# Patient Record
Sex: MALE | Race: BLACK OR AFRICAN AMERICAN | NOT HISPANIC OR LATINO | Employment: FULL TIME | URBAN - NONMETROPOLITAN AREA
[De-identification: names, ages, dates, MRNs, and addresses within clinical notes are randomized per-mention and may not be internally consistent; named-entity substitution may affect disease eponyms.]

---

## 2019-05-07 ENCOUNTER — APPOINTMENT (OUTPATIENT)
Dept: CT IMAGING | Facility: HOSPITAL | Age: 24
End: 2019-05-07

## 2019-05-07 ENCOUNTER — HOSPITAL ENCOUNTER (EMERGENCY)
Facility: HOSPITAL | Age: 24
Discharge: HOME | End: 2019-05-08
Admitting: EMERGENCY MEDICINE

## 2019-05-07 DIAGNOSIS — S01.81XA LACERATION OF FOREHEAD, INITIAL ENCOUNTER: Primary | ICD-10-CM

## 2019-05-07 PROCEDURE — 99283 EMERGENCY DEPT VISIT LOW MDM: CPT

## 2019-05-07 PROCEDURE — 70450 CT HEAD/BRAIN W/O DYE: CPT

## 2019-05-07 PROCEDURE — 70486 CT MAXILLOFACIAL W/O DYE: CPT

## 2019-05-07 RX ORDER — LIDOCAINE HYDROCHLORIDE AND EPINEPHRINE 10; 10 MG/ML; UG/ML
10 INJECTION, SOLUTION INFILTRATION; PERINEURAL ONCE
Status: COMPLETED | OUTPATIENT
Start: 2019-05-07 | End: 2019-05-08

## 2019-05-07 RX ORDER — LIDOCAINE HYDROCHLORIDE 10 MG/ML
10 INJECTION, SOLUTION EPIDURAL; INFILTRATION; INTRACAUDAL; PERINEURAL ONCE
Status: DISCONTINUED | OUTPATIENT
Start: 2019-05-07 | End: 2019-05-08 | Stop reason: HOSPADM

## 2019-05-08 VITALS
DIASTOLIC BLOOD PRESSURE: 73 MMHG | SYSTOLIC BLOOD PRESSURE: 132 MMHG | BODY MASS INDEX: 20.3 KG/M2 | WEIGHT: 158.2 LBS | OXYGEN SATURATION: 98 % | TEMPERATURE: 98.2 F | RESPIRATION RATE: 20 BRPM | HEIGHT: 74 IN | HEART RATE: 60 BPM

## 2019-05-08 RX ADMIN — LIDOCAINE HYDROCHLORIDE,EPINEPHRINE BITARTRATE 10 ML: 10; .01 INJECTION, SOLUTION INFILTRATION; PERINEURAL at 01:29

## 2019-05-08 NOTE — DISCHARGE INSTRUCTIONS
Suture removal in 5 days at PCP, urgent care, ED    Keep dry 24 hours, wash 3x a day with warm soapy water, gentle rinse, no scrubbing    Return to ED for worsening symptoms including pain, redness, swelling, drainage      Follow up with one of the [facility] physician groups below to setup primary care. If you have trouble following up, please call the [facility] Nurse Line at [phone]    Dr. Shahid Son DO, Dr. Francisco J Fontana DO,  MAGGI Youssef, and MAGGI Esteves  [facility] - [address], [address], [place], [place] [id number] [phone]     Dr. Iris Villegas MD, and MAGGI Cook  [facility] [address] [address], [place], [place] [id number] [phone]    Dr. Eldon Reaves MD and Dr. Khai Fulton MD  [facility] [address], [id number] [phone]    Dr. Kenny Spain MD  [facility]  [address] [place] [address], [address], [place], [place], [address] [phone]    Dr. Cm Kurtz MD  [facility]  [address], [place], [address] [phone]

## 2019-05-08 NOTE — ED PROVIDER NOTES
Subjective   Please look at other note this is purely for procedure note.            Review of Systems    History reviewed. No pertinent past medical history.    No Known Allergies    History reviewed. No pertinent surgical history.    History reviewed. No pertinent family history.    Social History     Socioeconomic History   • Marital status: Single     Spouse name: Not on file   • Number of children: Not on file   • Years of education: Not on file   • Highest education level: Not on file   Tobacco Use   • Smoking status: Former Smoker   • Smokeless tobacco: Current User     Types: Snuff   Substance and Sexual Activity   • Alcohol use: Yes     Frequency: Never     Comment: 2 drinks per week when off work   • Drug use: Defer   • Sexual activity: Defer           Objective   Physical Exam    Laceration Repair  Date/Time: 5/8/2019 1:07 AM  Performed by: Jodi Bee MD  Authorized by: Jodi Bee MD     Consent:     Consent obtained:  Verbal    Consent given by:  Patient    Risks discussed:  Need for additional repair, pain, poor cosmetic result, infection, nerve damage and poor wound healing    Alternatives discussed:  No treatment and delayed treatment  Anesthesia (see MAR for exact dosages):     Anesthesia method:  Local infiltration    Local anesthetic:  Lidocaine 1% WITH epi  Laceration details:     Location:  Face    Face location:  Forehead    Length (cm):  1  Repair type:     Repair type:  Simple  Pre-procedure details:     Preparation:  Patient was prepped and draped in usual sterile fashion and imaging obtained to evaluate for foreign bodies  Exploration:     Hemostasis achieved with:  Direct pressure    Wound exploration: wound explored through full range of motion    Skin repair:     Repair method:  Sutures    Suture size:  5-0    Suture material:  Prolene    Suture technique:  Simple interrupted    Number of sutures:  4  Approximation:     Approximation:  Close    Vermilion border: well-aligned     Post-procedure details:     Dressing:  Antibiotic ointment    Patient tolerance of procedure:  Tolerated well, no immediate complications               ED Course                  MDM      Final diagnoses:   Laceration of forehead, initial encounter            Jodi Bee MD  05/08/19 0109

## 2019-05-08 NOTE — ED PROVIDER NOTES
Subjective   History of Present Illness  23-year-old male presents with chief complaint of laceration to forehead.  Patient reports he was working on a boat and went to go present alarm and while walking briskly his forehead had hit against a metal bar that was spent at 90 degree angle and had caused a laceration to his forehead.  He reports copious bleeding but had achieved hemostasis prior to arrival.  Patient denies loss of consciousness nausea or vomiting but does note significant forehead swelling and tenderness.  Review of Systems   All other systems reviewed and are negative.      History reviewed. No pertinent past medical history.    No Known Allergies    History reviewed. No pertinent surgical history.    History reviewed. No pertinent family history.    Social History     Socioeconomic History   • Marital status: Single     Spouse name: Not on file   • Number of children: Not on file   • Years of education: Not on file   • Highest education level: Not on file   Tobacco Use   • Smoking status: Former Smoker   • Smokeless tobacco: Current User     Types: Snuff   Substance and Sexual Activity   • Alcohol use: Yes     Frequency: Never     Comment: 2 drinks per week when off work   • Drug use: Defer   • Sexual activity: Defer           Objective   Physical Exam   Constitutional: He is oriented to person, place, and time. He appears well-developed and well-nourished.   HENT:   Head: Normocephalic.   Upon evaluation of the forehead there is brisk bleeding over a 1 cm laceration with a hematoma underneath.  Tenderness to palpation   Eyes: EOM are normal. Pupils are equal, round, and reactive to light.   Neck: Normal range of motion. Neck supple.   Cardiovascular: Normal rate and regular rhythm.   Pulmonary/Chest: Effort normal and breath sounds normal.   Abdominal: Soft. Bowel sounds are normal.   Musculoskeletal: Normal range of motion.   Neurological: He is alert and oriented to person, place, and time. No  cranial nerve deficit. Coordination normal.   Skin: Skin is warm and dry. Capillary refill takes less than 2 seconds.   Psychiatric: He has a normal mood and affect. His behavior is normal.   Nursing note and vitals reviewed.      Procedures           ED Course          Labs Reviewed - No data to display  CT Maxillofacial Without Contrast    (Results Pending)   CT Head Without Contrast    (Results Pending)             MDM  Number of Diagnoses or Management Options  Laceration of forehead, initial encounter: new and requires workup  Diagnosis management comments: Dr. Bee performed laceration repair, neuro exam and ct normal, will dc in stable condition        Amount and/or Complexity of Data Reviewed  Tests in the radiology section of CPT®: reviewed and ordered    Risk of Complications, Morbidity, and/or Mortality  Presenting problems: moderate  Diagnostic procedures: moderate  Management options: moderate    Patient Progress  Patient progress: stable        Final diagnoses:   Laceration of forehead, initial encounter            Connor Alexander PA-C  05/08/19 0113